# Patient Record
Sex: FEMALE | Race: WHITE | NOT HISPANIC OR LATINO | URBAN - METROPOLITAN AREA
[De-identification: names, ages, dates, MRNs, and addresses within clinical notes are randomized per-mention and may not be internally consistent; named-entity substitution may affect disease eponyms.]

---

## 2018-02-08 ENCOUNTER — APPOINTMENT (OUTPATIENT)
Dept: URBAN - METROPOLITAN AREA CLINIC 200 | Age: 53
Setting detail: DERMATOLOGY
End: 2018-02-08

## 2018-02-08 DIAGNOSIS — D18.0 HEMANGIOMA: ICD-10-CM

## 2018-02-08 DIAGNOSIS — L57.8 OTHER SKIN CHANGES DUE TO CHRONIC EXPOSURE TO NONIONIZING RADIATION: ICD-10-CM

## 2018-02-08 PROBLEM — D18.01 HEMANGIOMA OF SKIN AND SUBCUTANEOUS TISSUE: Status: ACTIVE | Noted: 2018-02-08

## 2018-02-08 PROCEDURE — 99213 OFFICE O/P EST LOW 20 MIN: CPT

## 2018-02-08 PROCEDURE — OTHER MIPS QUALITY: OTHER

## 2018-02-08 PROCEDURE — OTHER COUNSELING: OTHER

## 2018-02-08 PROCEDURE — OTHER REASSURANCE: OTHER

## 2018-02-08 ASSESSMENT — LOCATION SIMPLE DESCRIPTION DERM
LOCATION SIMPLE: NOSE
LOCATION SIMPLE: LEFT UPPER BACK

## 2018-02-08 ASSESSMENT — LOCATION ZONE DERM
LOCATION ZONE: NOSE
LOCATION ZONE: TRUNK

## 2018-02-08 ASSESSMENT — LOCATION DETAILED DESCRIPTION DERM
LOCATION DETAILED: NASAL TIP
LOCATION DETAILED: LEFT SUPERIOR MEDIAL UPPER BACK

## 2018-02-08 NOTE — PROCEDURE: MIPS QUALITY
Additional Notes: Patient received cessation counseling and referred to [facility] smoking cessation program. Additional Notes: Patient received cessation counseling and referred to [facility] smoking cessation program.

## 2018-04-18 ENCOUNTER — APPOINTMENT (OUTPATIENT)
Dept: URBAN - METROPOLITAN AREA CLINIC 200 | Age: 53
Setting detail: DERMATOLOGY
End: 2018-04-23

## 2018-04-18 DIAGNOSIS — D17 BENIGN LIPOMATOUS NEOPLASM: ICD-10-CM

## 2018-04-18 PROBLEM — D17.23 BENIGN LIPOMATOUS NEOPLASM OF SKIN AND SUBCUTANEOUS TISSUE OF RIGHT LEG: Status: ACTIVE | Noted: 2018-04-18

## 2018-04-18 PROCEDURE — 99212 OFFICE O/P EST SF 10 MIN: CPT

## 2018-04-18 PROCEDURE — OTHER COUNSELING: OTHER

## 2018-04-18 ASSESSMENT — LOCATION SIMPLE DESCRIPTION DERM: LOCATION SIMPLE: RIGHT THIGH

## 2018-04-18 ASSESSMENT — LOCATION DETAILED DESCRIPTION DERM: LOCATION DETAILED: RIGHT ANTERIOR DISTAL THIGH

## 2018-04-18 ASSESSMENT — LOCATION ZONE DERM: LOCATION ZONE: LEG

## 2019-06-25 ENCOUNTER — HOSPITAL ENCOUNTER (OUTPATIENT)
Facility: CLINIC | Age: 54
Discharge: HOME | End: 2019-06-25
Attending: FAMILY MEDICINE
Payer: COMMERCIAL

## 2019-06-25 VITALS
SYSTOLIC BLOOD PRESSURE: 110 MMHG | RESPIRATION RATE: 17 BRPM | TEMPERATURE: 98.3 F | WEIGHT: 125 LBS | DIASTOLIC BLOOD PRESSURE: 64 MMHG | OXYGEN SATURATION: 98 % | HEART RATE: 78 BPM

## 2019-06-25 DIAGNOSIS — J01.00 ACUTE MAXILLARY SINUSITIS, RECURRENCE NOT SPECIFIED: Primary | ICD-10-CM

## 2019-06-25 PROCEDURE — 99202 OFFICE O/P NEW SF 15 MIN: CPT | Performed by: FAMILY MEDICINE

## 2019-06-25 RX ORDER — AMOXICILLIN AND CLAVULANATE POTASSIUM 875; 125 MG/1; MG/1
1 TABLET, FILM COATED ORAL 2 TIMES DAILY
Qty: 20 TABLET | Refills: 0 | Status: SHIPPED | OUTPATIENT
Start: 2019-06-25 | End: 2019-07-05

## 2019-06-25 ASSESSMENT — ENCOUNTER SYMPTOMS
COUGH: 0
FEVER: 0

## 2019-06-25 NOTE — DISCHARGE INSTRUCTIONS
Take antibiotics as prescribed twice a day with food  Use probiotics or eat yogurt to help with any GI side effects  Saline sprays, Flonase nasal spray, Sudafed and Mucinex (Max strength 1200mg am and pm)  can be helpful with your condition  Take Advil or Tylenol for any pain or fever  Stay well hydrated  F/U with your PCP or return to Urgent Care if your symptoms do not resolve

## 2019-06-25 NOTE — ED PROVIDER NOTES
History  Chief Complaint   Patient presents with   • Sinusitis   • Earache / Otalgia   • Headache   • Chills     C/O bilateral ear pain. Pain is now constant.  No fevers  C/o sinus pressure and PND            History reviewed. No pertinent past medical history.    History reviewed. No pertinent surgical history.    History reviewed. No pertinent family history.    Social History   Substance Use Topics   • Smoking status: Unknown If Ever Smoked   • Smokeless tobacco: Never Used   • Alcohol use Defer       Review of Systems   Constitutional: Negative for fever.   HENT: Positive for ear pain and postnasal drip.    Respiratory: Negative for cough.        Physical Exam  ED Triage Vitals [06/25/19 1655]   Temp Heart Rate Resp BP SpO2   36.8 °C (98.3 °F) 78 17 110/64 98 %      Temp Source Heart Rate Source Patient Position BP Location FiO2 (%) (Set)   Oral Monitor Sitting Left upper arm --       Physical Exam   Constitutional: She appears well-nourished. No distress.   HENT:   Right Ear: Tympanic membrane is not erythematous. A middle ear effusion is present.   Left Ear: Tympanic membrane is retracted. Tympanic membrane is not erythematous.   Mouth/Throat: Oropharynx is clear and moist.   Turbs swollen bilateral   Eyes: Pupils are equal, round, and reactive to light. Conjunctivae are normal.   Cardiovascular: Normal heart sounds.    Pulmonary/Chest: Effort normal and breath sounds normal.   Lymphadenopathy:     She has no cervical adenopathy.   Nursing note and vitals reviewed.        Procedures  Procedures    UC Course  Clinical Impressions as of Jun 25 1705   Acute maxillary sinusitis, recurrence not specified       MDM  Number of Diagnoses or Management Options  Diagnosis management comments: Sinusitis  Augmentin plus OTC meds                 Chase Shepherd MD  06/25/19 1705

## 2019-08-11 ENCOUNTER — HOSPITAL ENCOUNTER (OUTPATIENT)
Facility: CLINIC | Age: 54
Discharge: HOME | End: 2019-08-11
Attending: FAMILY MEDICINE
Payer: COMMERCIAL

## 2019-08-11 VITALS
OXYGEN SATURATION: 99 % | SYSTOLIC BLOOD PRESSURE: 90 MMHG | DIASTOLIC BLOOD PRESSURE: 64 MMHG | TEMPERATURE: 98.1 F | WEIGHT: 126 LBS | HEART RATE: 63 BPM | RESPIRATION RATE: 17 BRPM

## 2019-08-11 DIAGNOSIS — K12.1 STOMATITIS: Primary | ICD-10-CM

## 2019-08-11 PROCEDURE — 99214 OFFICE O/P EST MOD 30 MIN: CPT | Performed by: FAMILY MEDICINE

## 2019-08-11 RX ORDER — VALACYCLOVIR HYDROCHLORIDE 1 G/1
1000 TABLET, FILM COATED ORAL 3 TIMES DAILY
Qty: 21 TABLET | Refills: 0 | Status: SHIPPED | OUTPATIENT
Start: 2019-08-11 | End: 2019-08-18

## 2019-08-11 RX ORDER — NYSTATIN 100000 [USP'U]/ML
500000 SUSPENSION ORAL 4 TIMES DAILY
Qty: 60 ML | Refills: 0 | Status: SHIPPED | OUTPATIENT
Start: 2019-08-11 | End: 2019-08-21

## 2019-08-11 NOTE — ED PROVIDER NOTES
History  Chief Complaint   Patient presents with   • Mouth Lesions     x12 days     To dentist 12 days ago and the next day sore tongue  And mouth -  Sores on the roof of mouth ( tny ) using peroxide mouth wash without help             History reviewed. No pertinent past medical history.    History reviewed. No pertinent surgical history.    History reviewed. No pertinent family history.    Social History   Substance Use Topics   • Smoking status: Unknown If Ever Smoked   • Smokeless tobacco: Never Used   • Alcohol use Defer       Review of Systems   HENT:        See HPI -  Roof mouth and tongue sore    All other systems reviewed and are negative.      Physical Exam  ED Triage Vitals [08/11/19 1109]   Temp Heart Rate Resp BP SpO2   36.7 °C (98.1 °F) 63 17 90/64 99 %      Temp Source Heart Rate Source Patient Position BP Location FiO2 (%) (Set)   Oral Monitor Sitting Left upper arm --       Physical Exam   Constitutional: She appears well-developed and well-nourished.   HENT:   No exudates, but tongue coated  And there are tiny aphthous ulcers (X2) on the roof of the mouth    Cardiovascular: Normal rate.    Pulmonary/Chest: Effort normal and breath sounds normal. No respiratory distress. She has no wheezes. She has no rales. She exhibits no tenderness.   Nursing note and vitals reviewed.        Procedures  Procedures    UC Course  Clinical Impressions as of Aug 11 1328   Stomatitis       MDM    Mouth wash  that covers thrush   and valtrex  to cover viral ulcers    Follow up  with PCP or dentist if persists            Javed Mcfarland DO  08/11/19 9644

## 2019-08-11 NOTE — DISCHARGE INSTRUCTIONS
Mouth wash  that covers thrush   and valtrex  to cover viral ulcers    Follow up  with PCP or dentist if persists

## 2019-08-24 ENCOUNTER — HOSPITAL ENCOUNTER (OUTPATIENT)
Facility: CLINIC | Age: 54
Discharge: HOME | End: 2019-08-24
Attending: INTERNAL MEDICINE
Payer: COMMERCIAL

## 2019-08-24 VITALS
OXYGEN SATURATION: 98 % | HEART RATE: 70 BPM | SYSTOLIC BLOOD PRESSURE: 112 MMHG | RESPIRATION RATE: 16 BRPM | TEMPERATURE: 98 F | WEIGHT: 128 LBS | DIASTOLIC BLOOD PRESSURE: 62 MMHG

## 2019-08-24 DIAGNOSIS — J02.9 ACUTE PHARYNGITIS, UNSPECIFIED ETIOLOGY: Primary | ICD-10-CM

## 2019-08-24 LAB — S PYO AG THROAT QL: NEGATIVE

## 2019-08-24 PROCEDURE — 99213 OFFICE O/P EST LOW 20 MIN: CPT | Performed by: INTERNAL MEDICINE

## 2019-08-24 PROCEDURE — 87880 STREP A ASSAY W/OPTIC: CPT | Mod: QW | Performed by: INTERNAL MEDICINE

## 2019-08-24 ASSESSMENT — ENCOUNTER SYMPTOMS
FEVER: 0
SORE THROAT: 1
VOICE CHANGE: 0
CHILLS: 0
COUGH: 0
TROUBLE SWALLOWING: 1

## 2019-08-24 NOTE — ED PROVIDER NOTES
History  Chief Complaint   Patient presents with   • Sore Throat     Here 2 weeks treated for thrush  Did rinse, did not do antiviral medicine  Mouth symptoms got better    Bit by bee 4 days ago- throat got horse after (she is not allergic to bees)  Saw pcp next day got steroid shot      Last night got burning sensation- took pepcid without relief  Tried benadryl without relief.      Tight when swallows, burning, feels like something in throat.              History reviewed. No pertinent past medical history.    History reviewed. No pertinent surgical history.    History reviewed. No pertinent family history.    Social History   Substance Use Topics   • Smoking status: Unknown If Ever Smoked   • Smokeless tobacco: Never Used   • Alcohol use Defer       Review of Systems   Constitutional: Negative for chills and fever.   HENT: Positive for sore throat and trouble swallowing. Negative for sneezing and voice change.    Respiratory: Negative for cough.        Physical Exam  ED Triage Vitals [08/24/19 0917]   Temp Heart Rate Resp BP SpO2   36.7 °C (98 °F) 70 16 112/62 98 %      Temp Source Heart Rate Source Patient Position BP Location FiO2 (%) (Set)   Oral Monitor Sitting Left upper arm --       Physical Exam   Constitutional: She appears well-developed and well-nourished.   HENT:   Mouth/Throat:       Cardiovascular: Normal rate and regular rhythm.    Pulmonary/Chest: Effort normal and breath sounds normal.   Abdominal: Soft. Bowel sounds are normal.   Skin: Skin is warm and dry.         Procedures  Procedures    UC Course  Clinical Impressions as of Aug 24 0938   Acute pharyngitis, unspecified etiology       MDM    Pharyngitis negative rapid strep.  Was here 2 weeks ago with complaints of oral ulcers and was diagnosed with possible thrush.  The symptoms are different has a little bit of globus sensation and sore throat.  Exam with erythema.  Suspect this is viral.  However symptoms persist would recommend follow-up  PCP           Basil Flores MD  08/24/19 0986

## 2019-08-24 NOTE — DISCHARGE INSTRUCTIONS
Try cepacol lozenges    Use tylenol for aches or fever      If symptoms persist after Monday please call PCP for evaluation

## 2019-12-02 ENCOUNTER — OFFICE VISIT (OUTPATIENT)
Dept: INTEGRATIVE MEDICINE | Age: 54
End: 2019-12-02

## 2019-12-02 VITALS
BODY MASS INDEX: 22.14 KG/M2 | HEIGHT: 62 IN | OXYGEN SATURATION: 98 % | DIASTOLIC BLOOD PRESSURE: 60 MMHG | TEMPERATURE: 97.4 F | SYSTOLIC BLOOD PRESSURE: 98 MMHG | HEART RATE: 65 BPM | RESPIRATION RATE: 18 BRPM | WEIGHT: 120.3 LBS

## 2019-12-02 DIAGNOSIS — R09.A2 GLOBUS SENSATION: ICD-10-CM

## 2019-12-02 DIAGNOSIS — A69.20 LYME DISEASE: Primary | ICD-10-CM

## 2019-12-02 DIAGNOSIS — R53.82 CHRONIC FATIGUE: ICD-10-CM

## 2019-12-02 PROCEDURE — INTG100 PR NEW COMP HEALTH ASSESSMENT: Performed by: FAMILY MEDICINE

## 2019-12-02 RX ORDER — CETIRIZINE HYDROCHLORIDE 10 MG/1
10 TABLET ORAL DAILY
COMMUNITY

## 2019-12-02 SDOH — HEALTH STABILITY: MENTAL HEALTH: HOW OFTEN DO YOU HAVE A DRINK CONTAINING ALCOHOL?: NEVER

## 2019-12-02 ASSESSMENT — ENCOUNTER SYMPTOMS
ADENOPATHY: 0
SPEECH DIFFICULTY: 1
SORE THROAT: 1
RESPIRATORY NEGATIVE: 1
NAUSEA: 0
LIGHT-HEADEDNESS: 0
CONFUSION: 0
NUMBNESS: 0
FACIAL SWELLING: 0
ABDOMINAL DISTENTION: 1
BLOOD IN STOOL: 0
DIZZINESS: 0
FLANK PAIN: 0
ARTHRALGIAS: 0
PHOTOPHOBIA: 0
ABDOMINAL PAIN: 0
PALPITATIONS: 0
MYALGIAS: 1
DIAPHORESIS: 0
FEVER: 0
DIFFICULTY URINATING: 0
RECTAL PAIN: 0
BACK PAIN: 0
COLOR CHANGE: 0
HALLUCINATIONS: 0
EYE PAIN: 0
TREMORS: 0
DYSURIA: 0
HEADACHES: 0
DIARRHEA: 0
SEIZURES: 0
CARDIOVASCULAR NEGATIVE: 1
ANAL BLEEDING: 0
FATIGUE: 1
CONSTIPATION: 0
CHILLS: 0
DECREASED CONCENTRATION: 1
ALLERGIC/IMMUNOLOGIC NEGATIVE: 1
SLEEP DISTURBANCE: 0
VOMITING: 0
TROUBLE SWALLOWING: 0
HEMATOLOGIC/LYMPHATIC NEGATIVE: 1

## 2019-12-02 NOTE — PROGRESS NOTES
"[facility] Encounter Note    Date: 2019    Name: Yasmin Fitzgerald    : 1965    Reason for visit:     Allergies: Iodine    Medications:  []    Supplements: []    Family History:   Family History   Problem Relation Age of Onset   • Dementia Biological Mother    • Dementia Mother's Brother    • Breast cancer Mother's Sister          Social History:   Social History     Socioeconomic History   • Marital status:      Spouse name: None   • Number of children: None   • Years of education: None   • Highest education level: None   Occupational History   • None   Social Needs   • Financial resource strain: None   • Food insecurity:     Worry: None     Inability: None   • Transportation needs:     Medical: None     Non-medical: None   Tobacco Use   • Smoking status: Never Smoker   • Smokeless tobacco: Never Used   Substance and Sexual Activity   • Alcohol use: Not Currently     Frequency: Never   • Drug use: Not Currently   • Sexual activity: None   Lifestyle   • Physical activity:     Days per week: None     Minutes per session: None   • Stress: None   Relationships   • Social connections:     Talks on phone: None     Gets together: None     Attends [employer] service: None     Active member of club or organization: None     Attends meetings of clubs or organizations: None     Relationship status: None   • Intimate partner violence:     Fear of current or ex partner: None     Emotionally abused: None     Physically abused: None     Forced sexual activity: None   Other Topics Concern   • None   Social History Narrative   • None        Vitals:   Visit Vitals  BP 98/60 (BP Location: Left upper arm, Patient Position: Sitting)   Pulse 65   Temp 36.3 °C (97.4 °F) (Oral)   Resp 18   Ht 1.575 m (5' 2\")   Wt 54.6 kg (120 lb 4.8 oz)   LMP 2019   SpO2 98%   BMI 22.00 kg/m²       Weight: Weight: 54.6 kg (120 lb 4.8 oz)     Height: Height: 157.5 cm (5' 2\")     BMI:Body mass index is " "22 kg/m².    TRES:     % Body Fat: 31.2    % Muscle Mass: 45.0    Visceral Fat:  7    WC: []    WHR:    NSR:  73/min    HPI  54 yr old here for her first visit.  Hx of Lyme  Throat and tongue problems?    August got   Bad painweak. in the throat and felt   Saw Dr Magana and did a scope of the throat and told was inflammation, on ome[prazole for 2 months and no change after Df Zaranski but her on it 40 mg bid and no change exc a little only.     When did a round of nystatin and fluconazole orally seemed to help some.      Fogginess of mind also.    Did an Mri of the throat thhought their was an abnormality, did a bx of the back of the tongue, and told normal.     One side was uneven from the other  Still has sensation in the throat  Acid ?  Aloe juice, occ pepcid.  Still has the feeling, feels swelled in the tongue.      Had night sweats, and weird feeling, felt better off the meds.      June treated for sinus infection with augmentin.  Got over it maybe.  Seen again for possible.   yeast?    Dentist saw and then got sores in the mouth.  8/11  Nystatin rinse spit and another time swallowed it.  Then got fluconazel for 14 days.      Bit by a bee on 8/21, throat tightness , got a steroid shot after the bee sting due to tightness in the throat.   Went to ER 8/24  Sensation with iodine for a CT last year due to a liver lesion for pain, told by PCP was OK    That feeling persisted but like something in the throat.  Able to swallow, lost some weight, did not eat at night.      Night sweats, around menopause?  Went away and came back.    Felt off a little, not fatigue, rashes, achy, nO NV, diarhea, hA'S.     cHECKED OUT BY THE ent, SCOPE, mri. THEN BX OF THE BACK OF THE TONGUE.   Took the omeprazole and no change. Off that omeprazole for a month.      August treated with antifungal.    Had a EGD with \"mild\" acid findings.  Redness only.  In [place]    Sent scan to her gastroenterologist.  Wants to do an MRI with contrast.  "  For the 13th of this month.     Not pain when swallows.  Almost lik has to clear her throat.  ENT to recheck in 3 mo, also saw after the bx.  Which was 1 mo ago.      Hx Lyme about 4 years ago.    Had  Calf swelling and hard to walk.  Both sides.  Went to Dr Leon did test and told was Lyme, treated for a month with an antibiotic and Rx NSAID took for a month, did not improve?  which took months to get better.  Never rash or fever.  Had some fatigue    Did MRI of the calves, showed inflammation but no tears, no blood clot?    Dr Ferrer then saw and suggested she take the IV but she took antibx by mouth for 3 mo, then stopped and was getting yeast.  Over last four years, getting antibx off and on due to getting foggy headed.  Would give her doxycycline or biaxin?      6 mo before this June last treated.    No FU blood tests.      Had another tick on her in [place] had a tick on her thigh.      Waking up at night, mostly gets back to sleep.  Feels like sluggish.     Due to swelling taking the zyrtec, stopped a week ago, and not much any change.   Is neck sore a little in the back.      No comment on the thyroid.   Dr Leon did blood tests, told had Mono.    Sis has overactive thyroid, had surgery.    M:  Bp, dementia is now about 77.   Uncle maternal had Alzheimer's , in the 70's.      Never had allergy of food allergy testing.   Lemon with nilam, powder of turmeric.   Baking soda made her burp a lot.    Apple cider vinegar felt OK on that.     Told had a fibroid.  LMP April 2019.      Hands a little swollen now?  No pain or joint swelling?    No clear cut rhiitis.  Gets bags under the eyes seems better with claritin or zyrtec.      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      PP blues after [place], got very sad, move lived far away.  2 kids.    Lab, 2 cats, and the rickey    Resveratrol, b complex, cat's  claw.  Turmeric, occ nilam with lemon.    Not much fish.     Ear infections  ? Seizure as a baby.  Migraines used to get in her 20's with NV and cleared.        occ forgets words, word finding harder.  Maybe confusion at times.    No snoring.  No thrashing before.   Review of Systems   Constitutional: Positive for fatigue. Negative for chills, diaphoresis and fever.   HENT: Positive for sore throat. Negative for ear pain, facial swelling, hearing loss, mouth sores, nosebleeds, postnasal drip, tinnitus and trouble swallowing.    Eyes: Negative for photophobia, pain and visual disturbance.   Respiratory: Negative.    Cardiovascular: Negative.  Negative for palpitations.   Gastrointestinal: Positive for abdominal distention. Negative for abdominal pain, anal bleeding, blood in stool, constipation, diarrhea, nausea, rectal pain and vomiting.        Large meal gets bloated and a long time to get better.   Been like that for a while.    If eats a larger meal gets bloating.   Not certain foods.         Endocrine: Negative for cold intolerance and heat intolerance.   Genitourinary: Positive for menstrual problem (no period for 7 months, hot flashes and night sweats. ). Negative for difficulty urinating, dyspareunia, dysuria, enuresis, flank pain, pelvic pain and vaginal bleeding.   Musculoskeletal: Positive for myalgias. Negative for arthralgias, back pain and gait problem.   Skin: Negative for color change, pallor and rash.   Allergic/Immunologic: Negative.    Neurological: Positive for speech difficulty (on and off trouble with word finding). Negative for dizziness, tremors, seizures, syncope, light-headedness, numbness and headaches.   Hematological: Negative.  Negative for adenopathy.   Psychiatric/Behavioral: Positive for decreased concentration. Negative for confusion, hallucinations and sleep disturbance.         Physical Exam   Constitutional: She is oriented to person, place, and time. She appears well-developed  and well-nourished.   HENT:   Head: Normocephalic.   Right Ear: External ear normal.   Left Ear: External ear normal.   Nose: Nose normal.   Mouth/Throat: Uvula is midline, oropharynx is clear and moist and mucous membranes are normal. No oral lesions. No oropharyngeal exudate or posterior oropharyngeal erythema.   Scarring right TM  Back of tongue looks normal although pale.    Eyes: Conjunctivae and EOM are normal. No scleral icterus.   Neck: Normal range of motion. Neck supple. No thyromegaly present.   Cardiovascular: Normal rate, regular rhythm, normal heart sounds and intact distal pulses.   No murmur heard.  Pulmonary/Chest: Effort normal and breath sounds normal. She has no rales.   Abdominal: Soft. Bowel sounds are normal. She exhibits no distension and no mass. There is no tenderness. There is no rebound.   Musculoskeletal: Normal range of motion. She exhibits no edema.   Tight posterior cerv muscle s and the upper traps   Lymphadenopathy:     She has no cervical adenopathy.   Neurological: She is alert and oriented to person, place, and time. No cranial nerve deficit.   Skin: Skin is warm and dry. No rash noted.   Psychiatric: She has a normal mood and affect. Her speech is normal and behavior is normal. Judgment and thought content normal.     Physical Exam   Constitutional: He is oriented to person, place, and time. He appears well-developed and well-nourished.   Eyes: Conjunctivae and EOM are normal.   Neck: No thyromegaly present.   Cardiovascular: Normal rate, regular rhythm, normal heart sounds and intact distal pulses.   Pulmonary/Chest: Effort normal and breath sounds normal.   Neurological: He is alert and oriented to person, place, and time.   Skin: Skin is warm and dry.      Vitals reviewed.    Other:    Labs: none available    MSQ Score: [] 78    ASSESSMENT: []  1. Globus sensation of the throat:  Tension, muscle strain, enlarged thyroid, esophageal issues (reflux, yeast,  "eosinophilic?)  2. Swollen back of the tongue with biopsy normal  3. Intermittent cognitive difficulties with word finding and fuzzy headed feeling for 2 - 3 years after initial treatment for Lyme 4 years ago  4. Prolonged treatment for Lyme and recurrent treatments with Doxy for \"brain fog\"  5. Husb with hx of Lyme and Babesia also  6. Exposure to Mold at home, in midst of remediation now.   7. Menopause (LMP 7 mo ago) with hot flashes, night sweats, and loss of libido this year. Effect on cognition?  8. Raised on a farm  9. Recent weight loss with throat sx  10. Sensitive to products/smells  11. Co infection from Ticks?  New tick bite in June  12. Sinus infection 6/2015, given augmentin  13. Yeast infection of throat , esophagus or bowel s/p antibiotics?  Seemed to feel better in her head on the 2 weeks of diflucan  14. Disturbed sleep      PLAN:[]  1. I have asked her to begin an elimination diet, to see if the change in her diet, micro-biome, and eating habits, have any effect on her vague symptoms of throat discomfort, swelling, and cognitive difficulties.  She will do the 7 food elimination diet on her website, informational resources given and discussed.  She will do 4 weeks elimination, and then do a rechallenge one food group at a time, which we reviewed.  Probably will start this around New Year's.  2. She should consider taking fish oil 2000 mg a day since she is not taking in any omega-3 fatty acids.  She should begin grinding her flaxseed, and storing it in the refrigerator.  3. Continue with the current exercise program.  4. Multiple laboratories ordered to assess her health with her history.  Planning on checking mercury and lead, female hormone panel, nutrients, thyroid panel, CMP, CBC, iron, MMP 9, autoimmune screening test.  5. Obtain old records including her old laboratories about Lyme.  6. Consider further herbal treatments such as Banderal and salmento, in addition to the current cats claw, " resveratrol and B vitamins that she is taking.  7. Continue exercise program and proper hydration.  8. To take the [facility] test download on her computer about her response to Mold we suspect is at their home.   9. Consider additional stress reduction techniques to see if this helps with her perimenopausal symptoms.    RTO:   [] 6 weeks

## 2019-12-03 LAB
ALBUMIN SERPL-MCNC: 4.6 G/DL (ref 3.5–5.5)
ALBUMIN/GLOB SERPL: 1.9 {RATIO} (ref 1.2–2.2)
ALP SERPL-CCNC: 73 IU/L (ref 39–117)
ALT SERPL-CCNC: 9 IU/L (ref 0–32)
AST SERPL-CCNC: 18 IU/L (ref 0–40)
BASOPHILS # BLD AUTO: 0 X10E3/UL (ref 0–0.2)
BASOPHILS NFR BLD AUTO: 1 %
BILIRUB SERPL-MCNC: 0.4 MG/DL (ref 0–1.2)
BUN SERPL-MCNC: 15 MG/DL (ref 6–24)
BUN/CREAT SERPL: 19 (ref 9–23)
CALCIUM SERPL-MCNC: 10.1 MG/DL (ref 8.7–10.2)
CHLORIDE SERPL-SCNC: 99 MMOL/L (ref 96–106)
CO2 SERPL-SCNC: 22 MMOL/L (ref 20–29)
CREAT SERPL-MCNC: 0.8 MG/DL (ref 0.57–1)
EOSINOPHIL # BLD AUTO: 0 X10E3/UL (ref 0–0.4)
EOSINOPHIL NFR BLD AUTO: 1 %
GLOBULIN SER CALC-MCNC: 2.4 G/DL (ref 1.5–4.5)
GLUCOSE SERPL-MCNC: 94 MG/DL (ref 65–99)
IMM GRANULOCYTES # BLD AUTO: 0 X10E3/UL (ref 0–0.1)
IMM GRANULOCYTES NFR BLD AUTO: 0 %
LAB CORP EGFR IF AFRICN AM: 98 ML/MIN/1.73
LAB CORP EGFR IF NONAFRICN AM: 85 ML/MIN/1.73
LYMPHOCYTES # BLD AUTO: 1.9 X10E3/UL (ref 0.7–3.1)
LYMPHOCYTES NFR BLD AUTO: 48 %
MONOCYTES # BLD AUTO: 0.5 X10E3/UL (ref 0.1–0.9)
MONOCYTES NFR BLD AUTO: 11 %
NEUTROPHILS # BLD AUTO: 1.6 X10E3/UL (ref 1.4–7)
NEUTROPHILS NFR BLD AUTO: 39 %
POTASSIUM SERPL-SCNC: 4.6 MMOL/L (ref 3.5–5.2)
PROT SERPL-MCNC: 7 G/DL (ref 6–8.5)
SODIUM SERPL-SCNC: 138 MMOL/L (ref 134–144)

## 2019-12-04 LAB
ANA TITR SER IF: NEGATIVE {TITER}
CCP IGA+IGG SERPL IA-ACNC: 16 UNITS (ref 0–19)
CRP SERPL-MCNC: 1 MG/L (ref 0–10)
ERYTHROCYTE [SEDIMENTATION RATE] IN BLOOD BY WESTERGREN METHOD: 2 MM/HR (ref 0–40)
HBA1C MFR BLD: 5.8 % (ref 4.8–5.6)
IODINE UR-MCNC: 84.7 UG/L (ref 28–544)
LAB CORP PLEASE NOTE:: NORMAL
LEAD CAPILLARY BLOOD: <1 UG/DL (ref 0–4)
RHEUMATOID FACT SERPL-ACNC: <10 IU/ML (ref 0–13.9)
T3FREE SERPL-MCNC: 3.3 PG/ML (ref 2–4.4)
T4 FREE SERPL-MCNC: 1.11 NG/DL (ref 0.82–1.77)
THYROGLOB AB SERPL-ACNC: <1 IU/ML (ref 0–0.9)
THYROPEROXIDASE AB SERPL-ACNC: 11 IU/ML (ref 0–34)
TSH SERPL DL<=0.005 MIU/L-ACNC: 2.67 UIU/ML (ref 0.45–4.5)

## 2019-12-05 ENCOUNTER — TELEPHONE (OUTPATIENT)
Dept: INTEGRATIVE MEDICINE | Age: 54
End: 2019-12-05

## 2019-12-05 LAB
25(OH)D3+25(OH)D2 SERPL-MCNC: 21.8 NG/ML (ref 30–100)
A ALTERNATA IGE QN: <0.1 KU/L
A FUMIGATUS IGE QN: <0.1 KU/L
BERMUDA GRASS IGE QN: 0.15 KU/L
BOXELDER IGE QN: 0.15 KU/L
C HERBARUM IGE QN: <0.1 KU/L
CAT DANDER IGE QN: <0.1 KU/L
CLAM IGE QN: <0.1 KU/L
CMN PIGWEED IGE QN: 0.11 KU/L
CODFISH IGE QN: <0.1 KU/L
COMMON RAGWEED IGE QN: 0.15 KU/L
CORN IGE QN: 0.11 KU/L
CORTIS AM PEAK SERPL-MCNC: 14.6 UG/DL (ref 6.2–19.4)
COTTONWOOD IGE QN: 0.13 KU/L
COW MILK IGE QN: <0.1 KU/L
D FARINAE IGE QN: <0.1 KU/L
D PTERONYSS IGE QN: <0.1 KU/L
DHEA-S SERPL-MCNC: 181.6 UG/DL (ref 41.2–243.7)
DOG DANDER IGE QN: <0.1 KU/L
EGG WHITE IGE QN: <0.1 KU/L
ERYTHROCYTE [DISTWIDTH] IN BLOOD BY AUTOMATED COUNT: 12.5 % (ref 12.3–15.4)
ESTRADIOL SERPL-MCNC: 100.7 PG/ML
FERRITIN SERPL-MCNC: 64 NG/ML (ref 15–150)
FOLATE SERPL-MCNC: 12.5 NG/ML
GLIADIN PEPTIDE IGG SER-ACNC: 2 UNITS (ref 0–19)
HCT VFR BLD AUTO: 38 % (ref 34–46.6)
HCYS SERPL-SCNC: 9 UMOL/L (ref 0–15)
HGB BLD-MCNC: 13.1 G/DL (ref 11.1–15.9)
IGA SERPL-MCNC: 76 MG/DL (ref 87–352)
IGE SERPL-ACNC: 15 IU/ML (ref 6–495)
JOHNSON GRASS IGE QN: 0.17 KU/L
LAB CORP CLASS DESCRIPTION: ABNORMAL
LAB CORP DISCLAIMER:: ABNORMAL
MCH RBC QN AUTO: 29.9 PG (ref 26.6–33)
MCHC RBC AUTO-ENTMCNC: 34.5 G/DL (ref 31.5–35.7)
MCV RBC AUTO: 87 FL (ref 79–97)
MERCURY SERPL-MCNC: <5 NG/ML
METHYLMALONATE SERPL-SCNC: 144 NMOL/L (ref 0–378)
MOUSE URINE PROT IGE QN: <0.1 KU/L
MT JUNIPER IGE QN: 0.15 KU/L
P NOTATUM IGE QN: <0.1 KU/L
PEANUT IGE QN: 0.19 KU/L
PECAN/HICK TREE IGE QN: <0.1 KU/L
PLATELET # BLD AUTO: 228 X10E3/UL (ref 150–450)
PROGEST SERPL-MCNC: 0.3 NG/ML
PROLACTIN SERPL-MCNC: 11.4 NG/ML (ref 4.8–23.3)
RBC # BLD AUTO: 4.38 X10E6/UL (ref 3.77–5.28)
ROACH IGE QN: 0.15 KU/L
SCALLOP IGE QN: 0.14 KU/L
SESAME SEED IGE QN: 0.17 KU/L
SHBG SERPL-SCNC: 99.9 NMOL/L (ref 17.3–125)
SHEEP SORREL IGE QN: 0.15 KU/L
SHRIMP IGE QN: <0.1 KU/L
SILVER BIRCH IGE QN: <0.1 KU/L
SOYBEAN IGE QN: <0.1 KU/L
TESTOST SERPL-MCNC: 23.7 NG/DL
TIMOTHY IGE QN: 0.19 KU/L
TTG IGA SER-ACNC: <2 U/ML (ref 0–3)
TTG IGG SER-ACNC: <2 U/ML (ref 0–5)
VIT B12 SERPL-MCNC: 477 PG/ML (ref 232–1245)
WALNUT IGE QN: <0.1 KU/L
WBC # BLD AUTO: 4 X10E3/UL (ref 3.4–10.8)
WHEAT IGE QN: 0.11 KU/L
WHITE ELM IGE QN: 0.16 KU/L
WHITE MULBERRY IGE QN: <0.1 KU/L
WHITE OAK IGE QN: 0.2 KU/L

## 2019-12-06 ENCOUNTER — TELEPHONE (OUTPATIENT)
Dept: INTEGRATIVE MEDICINE | Age: 54
End: 2019-12-06

## 2019-12-06 LAB — PREG SERPL-MCNC: 119 NG/DL

## 2019-12-06 RX ORDER — VIT C/E/ZN/COPPR/LUTEIN/ZEAXAN 250MG-90MG
500 CAPSULE ORAL DAILY
COMMUNITY

## 2019-12-06 RX ORDER — ERGOCALCIFEROL 1.25 MG/1
50000 CAPSULE ORAL WEEKLY
Qty: 12 CAPSULE | Refills: 0 | Status: SHIPPED | OUTPATIENT
Start: 2019-12-06 | End: 2020-12-05

## 2019-12-06 NOTE — TELEPHONE ENCOUNTER
Spoke with Yasmin, She is aware of deficiency's. E-prescribed vitamin d. Entered order for 3 month vitamin d and calcium. Patient is going to start elimination diet after the holidays. Scheduled follow up.

## 2019-12-06 NOTE — TELEPHONE ENCOUNTER
----- Message from Hussain Zapata MD sent at 12/5/2019  6:26 PM EST -----  Vit D Low AT 21, b12 Suboptimal < 500:  Please start on Vit D2 50,000 units one a week with a meal for 12 weeks, recheck the Vit D and calcium in 3 months, E55.9 and hypercalcemia code.  Also Start Vitamin B12 over the counter at 500 ugm one a day.      Average three month blood sugar is high at 5.8 (pre diabetic range)  Need to address with diet and supplements.   celiac screens are negative .   Has multiple low level reactions to many grasses and tress, and to multiple foods. Will see how she is doing after the Elimination diet plan, will consider further stool testing based on how she is feeling.

## 2019-12-09 LAB
HLA-DQB1: NORMAL
HLA-DQB1: NORMAL
HLA-DRB1: NORMAL
HLA-DRB1: NORMAL
HLA-DRB3: NORMAL
HLA-DRB3: NORMAL
HLA-DRB4: NORMAL
HLA-DRB4: NORMAL
HLA-DRB5: NORMAL
HLA-DRB5: NORMAL
REF LAB TEST METHOD: NORMAL

## 2019-12-13 LAB — MMP9 SER-MCNC: 274 NG/ML
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
## 2019-12-30 ENCOUNTER — OFFICE VISIT (OUTPATIENT)
Dept: INTEGRATIVE MEDICINE | Age: 54
End: 2019-12-30

## 2019-12-30 VITALS
BODY MASS INDEX: 22.12 KG/M2 | TEMPERATURE: 98 F | DIASTOLIC BLOOD PRESSURE: 64 MMHG | SYSTOLIC BLOOD PRESSURE: 102 MMHG | HEIGHT: 62 IN | RESPIRATION RATE: 16 BRPM | OXYGEN SATURATION: 98 % | WEIGHT: 120.2 LBS | HEART RATE: 89 BPM

## 2019-12-30 DIAGNOSIS — R09.A2 GLOBUS SENSATION: ICD-10-CM

## 2019-12-30 DIAGNOSIS — E55.9 VITAMIN D DEFICIENCY: ICD-10-CM

## 2019-12-30 DIAGNOSIS — R53.82 CHRONIC FATIGUE: ICD-10-CM

## 2019-12-30 DIAGNOSIS — A69.20 LYME DISEASE: ICD-10-CM

## 2019-12-30 DIAGNOSIS — G31.84 MCI (MILD COGNITIVE IMPAIRMENT): Primary | ICD-10-CM

## 2019-12-30 PROCEDURE — INTG101 PR FIRST FOLLOW-UP CONSULT (90 MIN): Performed by: FAMILY MEDICINE

## 2020-01-20 ENCOUNTER — TELEPHONE (OUTPATIENT)
Dept: INTEGRATIVE MEDICINE | Age: 55
End: 2020-01-20

## 2020-01-20 DIAGNOSIS — E55.9 VITAMIN D DEFICIENCY DISEASE: Primary | ICD-10-CM

## 2020-01-20 DIAGNOSIS — E83.52 HYPERCALCEMIA: ICD-10-CM

## 2020-01-20 NOTE — TELEPHONE ENCOUNTER
CALL:      Can stop the Vitamin D for now.  Hold on to the pills.   If no other FU labs test was ordered, needs to have drawn in 1 month:    Vitamin D level,  Calcium, serum phosphorus  Ionized calcium, intact PTH    Diagnosis E 55.9  Hypercalcemia

## 2020-01-20 NOTE — TELEPHONE ENCOUNTER
Emory Medrano just wondering if you received my labs yet. I put in another request today. My question for you is if my calcium is slightly high should I stop taking the vitamin D supplement? If you have not received my labs please let me know. Thanks again.

## 2020-02-04 ENCOUNTER — APPOINTMENT (OUTPATIENT)
Dept: URBAN - METROPOLITAN AREA CLINIC 200 | Age: 55
Setting detail: DERMATOLOGY
End: 2020-02-18

## 2020-02-04 DIAGNOSIS — L57.8 OTHER SKIN CHANGES DUE TO CHRONIC EXPOSURE TO NONIONIZING RADIATION: ICD-10-CM

## 2020-02-04 DIAGNOSIS — L21.8 OTHER SEBORRHEIC DERMATITIS: ICD-10-CM

## 2020-02-04 DIAGNOSIS — L20.84 INTRINSIC (ALLERGIC) ECZEMA: ICD-10-CM

## 2020-02-04 DIAGNOSIS — L71.8 OTHER ROSACEA: ICD-10-CM

## 2020-02-04 PROCEDURE — OTHER COUNSELING: OTHER

## 2020-02-04 PROCEDURE — 99213 OFFICE O/P EST LOW 20 MIN: CPT

## 2020-02-04 PROCEDURE — OTHER MIPS QUALITY: OTHER

## 2020-02-04 PROCEDURE — OTHER PRESCRIPTION: OTHER

## 2020-02-04 RX ORDER — CLOBETASOL PROPIONATE 0.5 MG/G
CREAM TOPICAL
Qty: 1 | Refills: 3 | Status: ERX | COMMUNITY
Start: 2020-02-04

## 2020-02-04 ASSESSMENT — LOCATION SIMPLE DESCRIPTION DERM
LOCATION SIMPLE: CHEST
LOCATION SIMPLE: LEFT EYEBROW
LOCATION SIMPLE: LEFT CHEEK
LOCATION SIMPLE: LEFT HEEL

## 2020-02-04 ASSESSMENT — LOCATION DETAILED DESCRIPTION DERM
LOCATION DETAILED: LEFT MEDIAL SUPERIOR CHEST
LOCATION DETAILED: LEFT HEEL
LOCATION DETAILED: LEFT INFERIOR CENTRAL MALAR CHEEK
LOCATION DETAILED: LEFT CENTRAL EYEBROW

## 2020-02-04 ASSESSMENT — LOCATION ZONE DERM
LOCATION ZONE: FEET
LOCATION ZONE: FACE
LOCATION ZONE: TRUNK

## 2020-02-04 NOTE — PROCEDURE: COUNSELING
Patient Specific Counseling (Will Not Stick From Patient To Patient): Eucerin roughness relief spot treatment
Detail Level: Zone
Detail Level: Generalized

## 2020-02-17 LAB
25(OH)D3+25(OH)D2 SERPL-MCNC: 25.3 NG/ML (ref 30–100)
CALCIUM SERPL-MCNC: 9.4 MG/DL (ref 8.7–10.2)
PHOSPHATE SERPL-MCNC: 2.7 MG/DL (ref 3–4.3)

## 2020-02-18 ENCOUNTER — TELEPHONE (OUTPATIENT)
Dept: INTEGRATIVE MEDICINE | Age: 55
End: 2020-02-18

## 2020-02-18 DIAGNOSIS — E83.39 HYPOPHOSPHATEMIA: Primary | ICD-10-CM

## 2020-02-18 DIAGNOSIS — Z79.899 ENCOUNTER FOR LONG-TERM (CURRENT) USE OF OTHER MEDICATIONS: ICD-10-CM

## 2020-02-18 LAB
CA-I SERPL ISE-MCNC: 5.2 MG/DL (ref 4.5–5.6)
PTH-INTACT SERPL-MCNC: 25 PG/ML (ref 15–65)

## 2020-02-18 NOTE — TELEPHONE ENCOUNTER
----- Message from Hussain Zapata MD sent at 2/18/2020  1:02 PM EST -----  Call and tell her the Vitamin D is a little better than before (off the weekly Vit D for a month?):  Calcium and Parathyroid hormone levels are normal.  I would take the Vitamin D at a 2000 units once a day with a large meal from now on.      Her Phosphorus is low, which is usually due to not absorbing the nutrient through the intestine adequately since it is in most foods.  It can cause many vague symptoms like fatigue or tingling, so I would take the prescription supplement K-Phos 2 pills a day for 6 weeks and then recheck the calcium and phosphorus levels, hypophosphatemia code.

## 2020-02-18 NOTE — TELEPHONE ENCOUNTER
Before I call her, would you like me to call in the K-Phos Neutral 250 mg 2 daily?    LM to call back re labs

## 2020-09-09 ENCOUNTER — APPOINTMENT (OUTPATIENT)
Dept: URBAN - METROPOLITAN AREA CLINIC 200 | Age: 55
Setting detail: DERMATOLOGY
End: 2020-09-24

## 2020-09-09 DIAGNOSIS — D485 NEOPLASM OF UNCERTAIN BEHAVIOR OF SKIN: ICD-10-CM

## 2020-09-09 PROBLEM — D48.5 NEOPLASM OF UNCERTAIN BEHAVIOR OF SKIN: Status: ACTIVE | Noted: 2020-09-09

## 2020-09-09 PROCEDURE — OTHER CONSENT FOR TELEMEDICINE VISIT OBTAINED: OTHER

## 2020-09-09 PROCEDURE — 99212 OFFICE O/P EST SF 10 MIN: CPT | Mod: 95

## 2020-09-09 PROCEDURE — OTHER TELEHEALTH ASSESSMENT: OTHER

## 2020-09-09 PROCEDURE — OTHER TELEHEALTH RECOMMENDATIONS: OTHER

## 2020-09-09 ASSESSMENT — LOCATION SIMPLE DESCRIPTION DERM: LOCATION SIMPLE: LEFT LOWER BACK

## 2020-09-09 ASSESSMENT — LOCATION ZONE DERM: LOCATION ZONE: TRUNK

## 2020-09-09 ASSESSMENT — LOCATION DETAILED DESCRIPTION DERM: LOCATION DETAILED: LEFT INFERIOR MEDIAL MIDBACK

## 2020-09-09 ASSESSMENT — PAIN INTENSITY VAS: HOW INTENSE IS YOUR PAIN 0 BEING NO PAIN, 10 BEING THE MOST SEVERE PAIN POSSIBLE?: NO PAIN

## 2020-09-09 NOTE — HPI: SKIN LESION
How Severe Is Your Skin Lesion?: mild
Is This A New Presentation, Or A Follow-Up?: Skin Lesion
Additional History: When squeezed it acted like a black head and some stuff came out.

## 2023-02-09 ENCOUNTER — APPOINTMENT (OUTPATIENT)
Dept: URBAN - METROPOLITAN AREA CLINIC 203 | Age: 58
Setting detail: DERMATOLOGY
End: 2023-02-14

## 2023-02-09 DIAGNOSIS — L57.8 OTHER SKIN CHANGES DUE TO CHRONIC EXPOSURE TO NONIONIZING RADIATION: ICD-10-CM

## 2023-02-09 DIAGNOSIS — L70.8 OTHER ACNE: ICD-10-CM

## 2023-02-09 DIAGNOSIS — I83.9 ASYMPTOMATIC VARICOSE VEINS OF LOWER EXTREMITIES: ICD-10-CM

## 2023-02-09 DIAGNOSIS — D17 BENIGN LIPOMATOUS NEOPLASM: ICD-10-CM

## 2023-02-09 PROBLEM — I83.93 ASYMPTOMATIC VARICOSE VEINS OF BILATERAL LOWER EXTREMITIES: Status: ACTIVE | Noted: 2023-02-09

## 2023-02-09 PROBLEM — D17.23 BENIGN LIPOMATOUS NEOPLASM OF SKIN AND SUBCUTANEOUS TISSUE OF RIGHT LEG: Status: ACTIVE | Noted: 2023-02-09

## 2023-02-09 PROCEDURE — 99213 OFFICE O/P EST LOW 20 MIN: CPT

## 2023-02-09 PROCEDURE — OTHER REASSURANCE: OTHER

## 2023-02-09 PROCEDURE — OTHER COUNSELING: OTHER

## 2023-02-09 PROCEDURE — OTHER SUNSCREEN RECOMMENDATIONS: OTHER

## 2023-02-09 ASSESSMENT — LOCATION DETAILED DESCRIPTION DERM
LOCATION DETAILED: LEFT DORSAL FOOT
LOCATION DETAILED: RIGHT DORSAL FOOT
LOCATION DETAILED: INFERIOR THORACIC SPINE
LOCATION DETAILED: RIGHT ANTERIOR PROXIMAL THIGH
LOCATION DETAILED: RIGHT ANTERIOR PROXIMAL THIGH
LOCATION DETAILED: LEFT MEDIAL BREAST 10-11:00 REGION
LOCATION DETAILED: LEFT MEDIAL BREAST 11-12:00 REGION

## 2023-02-09 ASSESSMENT — LOCATION SIMPLE DESCRIPTION DERM
LOCATION SIMPLE: RIGHT THIGH
LOCATION SIMPLE: RIGHT FOOT
LOCATION SIMPLE: RIGHT THIGH
LOCATION SIMPLE: UPPER BACK
LOCATION SIMPLE: LEFT FOOT
LOCATION SIMPLE: LEFT BREAST

## 2023-02-09 ASSESSMENT — LOCATION ZONE DERM
LOCATION ZONE: TRUNK
LOCATION ZONE: LEG
LOCATION ZONE: LEG
LOCATION ZONE: FEET

## 2023-02-09 NOTE — PROCEDURE: REASSURANCE
Detail Level: Detailed
Hide Additional Notes?: No
Additional Notes (Optional): Pt did not want anything done, “ as long as it doesn’t hurt me “

## 2024-02-09 ENCOUNTER — APPOINTMENT (OUTPATIENT)
Dept: URBAN - METROPOLITAN AREA CLINIC 203 | Age: 59
Setting detail: DERMATOLOGY
End: 2024-02-17

## 2024-02-09 DIAGNOSIS — I83.9 ASYMPTOMATIC VARICOSE VEINS OF LOWER EXTREMITIES: ICD-10-CM

## 2024-02-09 DIAGNOSIS — L57.8 OTHER SKIN CHANGES DUE TO CHRONIC EXPOSURE TO NONIONIZING RADIATION: ICD-10-CM

## 2024-02-09 PROBLEM — I83.93 ASYMPTOMATIC VARICOSE VEINS OF BILATERAL LOWER EXTREMITIES: Status: ACTIVE | Noted: 2024-02-09

## 2024-02-09 PROCEDURE — 99212 OFFICE O/P EST SF 10 MIN: CPT

## 2024-02-09 PROCEDURE — OTHER COUNSELING: OTHER

## 2024-02-09 PROCEDURE — OTHER SUNSCREEN RECOMMENDATIONS: OTHER

## 2024-02-09 PROCEDURE — OTHER REASSURANCE: OTHER

## 2024-02-09 ASSESSMENT — LOCATION DETAILED DESCRIPTION DERM
LOCATION DETAILED: LEFT DORSAL FOOT
LOCATION DETAILED: RIGHT DORSAL FOOT
LOCATION DETAILED: LEFT MEDIAL BREAST 10-11:00 REGION
LOCATION DETAILED: LEFT MEDIAL BREAST 11-12:00 REGION

## 2024-02-09 ASSESSMENT — LOCATION SIMPLE DESCRIPTION DERM
LOCATION SIMPLE: RIGHT FOOT
LOCATION SIMPLE: LEFT BREAST
LOCATION SIMPLE: LEFT FOOT

## 2024-02-09 ASSESSMENT — LOCATION ZONE DERM
LOCATION ZONE: FEET
LOCATION ZONE: TRUNK

## 2024-12-26 ENCOUNTER — APPOINTMENT (OUTPATIENT)
Dept: URBAN - METROPOLITAN AREA CLINIC 203 | Age: 59
Setting detail: DERMATOLOGY
End: 2024-12-31

## 2024-12-26 DIAGNOSIS — D485 NEOPLASM OF UNCERTAIN BEHAVIOR OF SKIN: ICD-10-CM

## 2024-12-26 PROBLEM — D48.5 NEOPLASM OF UNCERTAIN BEHAVIOR OF SKIN: Status: ACTIVE | Noted: 2024-12-26

## 2024-12-26 PROCEDURE — 11102 TANGNTL BX SKIN SINGLE LES: CPT

## 2024-12-26 PROCEDURE — OTHER BIOPSY BY SHAVE METHOD: OTHER

## 2024-12-26 ASSESSMENT — LOCATION DETAILED DESCRIPTION DERM: LOCATION DETAILED: RIGHT SUPERIOR FOREHEAD

## 2024-12-26 ASSESSMENT — LOCATION SIMPLE DESCRIPTION DERM: LOCATION SIMPLE: RIGHT FOREHEAD

## 2024-12-26 ASSESSMENT — LOCATION ZONE DERM: LOCATION ZONE: FACE

## 2024-12-26 NOTE — PROCEDURE: BIOPSY BY SHAVE METHOD
Detail Level: Detailed
Depth Of Biopsy: dermis
Was A Bandage Applied: Yes
Size Of Lesion In Cm: 0.6
X Size Of Lesion In Cm: 0
Biopsy Type: H and E
Biopsy Method: Dermablade
Anesthesia Type: 1% lidocaine with epinephrine
Anesthesia Volume In Cc: 0.5
Hemostasis: Drysol
Wound Care: Petrolatum
Dressing: bandage
Destruction After The Procedure: No
Type Of Destruction Used: Curettage
Curettage Text: The wound bed was treated with curettage after the biopsy was performed.
Cryotherapy Text: The wound bed was treated with cryotherapy after the biopsy was performed.
Electrodesiccation Text: The wound bed was treated with electrodesiccation after the biopsy was performed.
Electrodesiccation And Curettage Text: The wound bed was treated with electrodesiccation and curettage after the biopsy was performed.
Silver Nitrate Text: The wound bed was treated with silver nitrate after the biopsy was performed.
Lab: [id number]
Medical Necessity Information: It is in your best interest to select a reason for this procedure from the list below. All of these items fulfill various CMS LCD requirements except the new and changing color options.
Consent: Written consent was obtained and risks were reviewed including but not limited to scarring, infection, bleeding, scabbing, incomplete removal, nerve damage and allergy to anesthesia.
Post-Care Instructions: I reviewed with the patient in detail post-care instructions. Patient is to keep the biopsy site dry overnight, and then apply bacitracin twice daily until healed. Patient may apply hydrogen peroxide soaks to remove any crusting.
Notification Instructions: Patient will be notified of biopsy results. However, patient instructed to call the office if not contacted within 2 weeks.
Billing Type: Third-Party Bill
Information: Selecting Yes will display possible errors in your note based on the variables you have selected. This validation is only offered as a suggestion for you. PLEASE NOTE THAT THE VALIDATION TEXT WILL BE REMOVED WHEN YOU FINALIZE YOUR NOTE. IF YOU WANT TO FAX A PRELIMINARY NOTE YOU WILL NEED TO TOGGLE THIS TO 'NO' IF YOU DO NOT WANT IT IN YOUR FAXED NOTE.

## 2025-02-10 ENCOUNTER — APPOINTMENT (OUTPATIENT)
Dept: URBAN - METROPOLITAN AREA CLINIC 203 | Age: 60
Setting detail: DERMATOLOGY
End: 2025-02-25

## 2025-02-10 DIAGNOSIS — L81.4 OTHER MELANIN HYPERPIGMENTATION: ICD-10-CM

## 2025-02-10 DIAGNOSIS — Z71.89 OTHER SPECIFIED COUNSELING: ICD-10-CM

## 2025-02-10 DIAGNOSIS — L57.8 OTHER SKIN CHANGES DUE TO CHRONIC EXPOSURE TO NONIONIZING RADIATION: ICD-10-CM

## 2025-02-10 DIAGNOSIS — D22 MELANOCYTIC NEVI: ICD-10-CM

## 2025-02-10 PROBLEM — D22.5 MELANOCYTIC NEVI OF TRUNK: Status: ACTIVE | Noted: 2025-02-10

## 2025-02-10 PROCEDURE — 99213 OFFICE O/P EST LOW 20 MIN: CPT

## 2025-02-10 PROCEDURE — OTHER SUNSCREEN RECOMMENDATIONS: OTHER

## 2025-02-10 PROCEDURE — OTHER COUNSELING: OTHER

## 2025-02-10 ASSESSMENT — LOCATION DETAILED DESCRIPTION DERM
LOCATION DETAILED: MIDDLE STERNUM
LOCATION DETAILED: LEFT SUPERIOR MEDIAL UPPER BACK
LOCATION DETAILED: LEFT MEDIAL BREAST 11-12:00 REGION
LOCATION DETAILED: LEFT INFERIOR MEDIAL FOREHEAD
LOCATION DETAILED: LEFT MEDIAL BREAST 10-11:00 REGION
LOCATION DETAILED: RIGHT SUPERIOR MEDIAL UPPER BACK

## 2025-02-10 ASSESSMENT — LOCATION SIMPLE DESCRIPTION DERM
LOCATION SIMPLE: LEFT BREAST
LOCATION SIMPLE: RIGHT UPPER BACK
LOCATION SIMPLE: LEFT UPPER BACK
LOCATION SIMPLE: CHEST
LOCATION SIMPLE: LEFT FOREHEAD

## 2025-02-10 ASSESSMENT — LOCATION ZONE DERM
LOCATION ZONE: TRUNK
LOCATION ZONE: FACE

## 2025-07-02 NOTE — HPI: EVALUATION OF SKIN LESION(S)
Her psoriatic arthritis symptoms are well-controlled sulfasalazine.  No signs of active inflammation or synovitis on exam today.  We will continue to monitor her response to treatment.  Labs as ordered monitor for medication side effects and toxicity.  Follow-up in 6 months or sooner if needed.    Orders:    CBC and differential; Future    Comprehensive metabolic panel; Future    Sedimentation rate, automated; Future    C-reactive protein; Future     Hpi Title: Evaluation of Skin Lesions

## 2025-08-12 ENCOUNTER — HOSPITAL ENCOUNTER (OUTPATIENT)
Facility: CLINIC | Age: 60
Discharge: HOME | End: 2025-08-12
Attending: FAMILY MEDICINE
Payer: COMMERCIAL

## 2025-08-12 VITALS
OXYGEN SATURATION: 99 % | HEIGHT: 63 IN | RESPIRATION RATE: 20 BRPM | TEMPERATURE: 97.7 F | WEIGHT: 130 LBS | DIASTOLIC BLOOD PRESSURE: 80 MMHG | SYSTOLIC BLOOD PRESSURE: 118 MMHG | HEART RATE: 78 BPM | BODY MASS INDEX: 23.04 KG/M2

## 2025-08-12 DIAGNOSIS — L03.114 CELLULITIS OF LEFT UPPER EXTREMITY: Primary | ICD-10-CM

## 2025-08-12 PROCEDURE — 99203 OFFICE O/P NEW LOW 30 MIN: CPT

## 2025-08-12 RX ORDER — DOXYCYCLINE 100 MG/1
100 CAPSULE ORAL 2 TIMES DAILY
Qty: 14 CAPSULE | Refills: 0 | Status: SHIPPED | OUTPATIENT
Start: 2025-08-12 | End: 2025-08-19

## 2025-08-12 ASSESSMENT — ENCOUNTER SYMPTOMS
COLOR CHANGE: 1
WOUND: 1
FEVER: 0
JOINT SWELLING: 0
FATIGUE: 1
WEAKNESS: 0
CHILLS: 1
NUMBNESS: 0
ARTHRALGIAS: 0
MYALGIAS: 1